# Patient Record
Sex: FEMALE | Race: WHITE | NOT HISPANIC OR LATINO | Employment: UNEMPLOYED | URBAN - METROPOLITAN AREA
[De-identification: names, ages, dates, MRNs, and addresses within clinical notes are randomized per-mention and may not be internally consistent; named-entity substitution may affect disease eponyms.]

---

## 2018-11-26 ENCOUNTER — TRANSCRIBE ORDERS (OUTPATIENT)
Dept: ADMINISTRATIVE | Facility: HOSPITAL | Age: 53
End: 2018-11-26

## 2018-11-26 DIAGNOSIS — Z12.39 BREAST SCREENING: Primary | ICD-10-CM

## 2018-12-12 ENCOUNTER — APPOINTMENT (OUTPATIENT)
Dept: MAMMOGRAPHY | Facility: HOSPITAL | Age: 53
End: 2018-12-12

## 2022-10-05 ENCOUNTER — OFFICE VISIT (OUTPATIENT)
Dept: CARDIOLOGY | Facility: CLINIC | Age: 57
End: 2022-10-05

## 2022-10-05 VITALS
HEIGHT: 65 IN | BODY MASS INDEX: 36.32 KG/M2 | HEART RATE: 91 BPM | WEIGHT: 218 LBS | RESPIRATION RATE: 16 BRPM | OXYGEN SATURATION: 95 % | SYSTOLIC BLOOD PRESSURE: 108 MMHG | DIASTOLIC BLOOD PRESSURE: 70 MMHG

## 2022-10-05 DIAGNOSIS — I25.2 HISTORY OF MI (MYOCARDIAL INFARCTION): Primary | ICD-10-CM

## 2022-10-05 DIAGNOSIS — I25.119 CORONARY ARTERY DISEASE INVOLVING NATIVE CORONARY ARTERY OF NATIVE HEART WITH ANGINA PECTORIS: ICD-10-CM

## 2022-10-05 DIAGNOSIS — R94.31 ABNORMAL ECG: ICD-10-CM

## 2022-10-05 DIAGNOSIS — Z01.810 PREOP CARDIOVASCULAR EXAM: ICD-10-CM

## 2022-10-05 PROBLEM — I25.709 CORONARY ARTERY DISEASE INVOLVING CORONARY BYPASS GRAFT OF NATIVE HEART WITH ANGINA PECTORIS: Status: ACTIVE | Noted: 2022-10-05

## 2022-10-05 PROCEDURE — 99204 OFFICE O/P NEW MOD 45 MIN: CPT | Performed by: INTERNAL MEDICINE

## 2022-10-05 PROCEDURE — 93000 ELECTROCARDIOGRAM COMPLETE: CPT | Performed by: INTERNAL MEDICINE

## 2022-10-05 RX ORDER — MELOXICAM 15 MG/1
15 TABLET ORAL DAILY
COMMUNITY
Start: 2022-08-18

## 2022-10-07 ENCOUNTER — TELEPHONE (OUTPATIENT)
Dept: CARDIOLOGY | Facility: CLINIC | Age: 57
End: 2022-10-07

## 2022-10-07 NOTE — TELEPHONE ENCOUNTER
There is a consult note on the patient- has that been provided?  Sounds like that needs to be sent

## 2022-10-07 NOTE — TELEPHONE ENCOUNTER
[employer] with [employer] called regarding her stress test that is schedule on 10/10/22.     The PA went through [facility]. She said that they are requesting a pt report but there is not a report in the pt chart.     Do you want to do a PEER TO PEER?    Kinza #: [phone]

## 2022-10-07 NOTE — TELEPHONE ENCOUNTER
Kinza called back.     They did faxed your office note to the insurance company. They are requesting the heart cath report from 1998. We do not have those records to provide to them.    Kinza wants to know if you could do a PEER to PEER? #: [phone] TRACK#: [id number]    Pt is schedule on Monday. They would need this done by 3:00PM.

## 2022-10-10 ENCOUNTER — APPOINTMENT (OUTPATIENT)
Dept: NUCLEAR MEDICINE | Facility: HOSPITAL | Age: 57
End: 2022-10-10

## 2022-10-10 ENCOUNTER — APPOINTMENT (OUTPATIENT)
Dept: CARDIOLOGY | Facility: HOSPITAL | Age: 57
End: 2022-10-10

## 2022-11-14 ENCOUNTER — APPOINTMENT (OUTPATIENT)
Dept: CARDIOLOGY | Facility: HOSPITAL | Age: 57
End: 2022-11-14

## 2022-11-14 ENCOUNTER — APPOINTMENT (OUTPATIENT)
Dept: NUCLEAR MEDICINE | Facility: HOSPITAL | Age: 57
End: 2022-11-14

## 2022-12-05 ENCOUNTER — HOSPITAL ENCOUNTER (OUTPATIENT)
Dept: NUCLEAR MEDICINE | Facility: HOSPITAL | Age: 57
Discharge: HOME | End: 2022-12-05

## 2022-12-05 ENCOUNTER — HOSPITAL ENCOUNTER (OUTPATIENT)
Dept: CARDIOLOGY | Facility: HOSPITAL | Age: 57
Discharge: HOME | End: 2022-12-05

## 2022-12-05 DIAGNOSIS — I25.2 HISTORY OF MI (MYOCARDIAL INFARCTION): ICD-10-CM

## 2022-12-05 DIAGNOSIS — I25.119 CORONARY ARTERY DISEASE INVOLVING NATIVE CORONARY ARTERY OF NATIVE HEART WITH ANGINA PECTORIS: ICD-10-CM

## 2022-12-05 DIAGNOSIS — Z01.810 PREOP CARDIOVASCULAR EXAM: ICD-10-CM

## 2022-12-05 LAB
BH CV REST NUCLEAR ISOTOPE DOSE: 10.9 MCI
BH CV STRESS BP STAGE 1: NORMAL
BH CV STRESS COMMENTS STAGE 1: NORMAL
BH CV STRESS DOSE REGADENOSON STAGE 1: 0.4
BH CV STRESS DURATION MIN STAGE 1: 0
BH CV STRESS DURATION SEC STAGE 1: 30
BH CV STRESS HR STAGE 1: 94
BH CV STRESS NUCLEAR ISOTOPE DOSE: 34.6 MCI
BH CV STRESS O2 STAGE 1: 99
BH CV STRESS PROTOCOL 1: NORMAL
BH CV STRESS RECOVERY BP: NORMAL MMHG
BH CV STRESS RECOVERY HR: 94 BPM
BH CV STRESS RECOVERY O2: 99 %
BH CV STRESS STAGE 1: 1
LV EF NUC BP: 40 %
MAXIMAL PREDICTED HEART RATE: 163 BPM
PERCENT MAX PREDICTED HR: 64.42 %
STRESS BASELINE BP: NORMAL MMHG
STRESS BASELINE HR: 88 BPM
STRESS O2 SAT REST: 98 %
STRESS PERCENT HR: 76 %
STRESS POST ESTIMATED WORKLOAD: 1 METS
STRESS POST EXERCISE DUR SEC: 30 SEC
STRESS POST O2 SAT PEAK: 100 %
STRESS POST PEAK BP: NORMAL MMHG
STRESS POST PEAK HR: 105 BPM
STRESS TARGET HR: 139 BPM

## 2022-12-05 PROCEDURE — 93018 CV STRESS TEST I&R ONLY: CPT | Performed by: INTERNAL MEDICINE

## 2022-12-05 PROCEDURE — 78452 HT MUSCLE IMAGE SPECT MULT: CPT | Performed by: INTERNAL MEDICINE

## 2022-12-05 PROCEDURE — 25010000002 REGADENOSON 0.4 MG/5ML SOLUTION: Performed by: INTERNAL MEDICINE

## 2022-12-05 PROCEDURE — 0 TECHNETIUM SESTAMIBI: Performed by: INTERNAL MEDICINE

## 2022-12-05 PROCEDURE — 93016 CV STRESS TEST SUPVJ ONLY: CPT | Performed by: INTERNAL MEDICINE

## 2022-12-05 PROCEDURE — 78452 HT MUSCLE IMAGE SPECT MULT: CPT

## 2022-12-05 PROCEDURE — A9500 TC99M SESTAMIBI: HCPCS | Performed by: INTERNAL MEDICINE

## 2022-12-05 PROCEDURE — 93017 CV STRESS TEST TRACING ONLY: CPT

## 2022-12-05 RX ADMIN — TECHNETIUM TC 99M SESTAMIBI 1 DOSE: 1 INJECTION INTRAVENOUS at 09:55

## 2022-12-05 RX ADMIN — TECHNETIUM TC 99M SESTAMIBI 1 DOSE: 1 INJECTION INTRAVENOUS at 08:27

## 2022-12-05 RX ADMIN — REGADENOSON 0.4 MG: 0.08 INJECTION, SOLUTION INTRAVENOUS at 09:55

## 2022-12-09 ENCOUNTER — TELEPHONE (OUTPATIENT)
Dept: CARDIOLOGY | Facility: CLINIC | Age: 57
End: 2022-12-09

## 2022-12-09 NOTE — TELEPHONE ENCOUNTER
I spoke with Lito Sinclair and updated pt on results/recommendations from provider.  Pt verbalized understanding and has no further questions at this time.    I faxed a report of ST with cardiac clearance to her doctor with [facility], Dr. Domenico Blevins, at [phone] per pt's request.    Thank you,    Haily Alvarado RN  Triage [facility]  12/09/22 11:45 EST

## 2022-12-09 NOTE — TELEPHONE ENCOUNTER
I tried to call Lito Sinclair but there was no answer.  Left a voicemail asking patient to call back.  Will continue to try to reach pt.    Thank you,    Haily Alvarado RN  Triage [facility]  12/09/22 09:34 EST

## 2022-12-09 NOTE — TELEPHONE ENCOUNTER
----- Message from Mirza Urena III, MD sent at 12/9/2022  9:24 AM EST -----  Please call-looks good with no ischemia seen (patient is known to have prior MI).  Okay to proceed with the plans for surgery from our standpoint

## 2023-04-21 ENCOUNTER — TELEPHONE (OUTPATIENT)
Dept: CARDIOLOGY | Facility: CLINIC | Age: 58
End: 2023-04-21
Payer: COMMERCIAL

## 2023-04-21 NOTE — TELEPHONE ENCOUNTER
Faxed pt's reports/record request to pre-admission testing fax [phone] . Received confirmation.     Felicita Mirza MA  4/21/23 3:25P

## 2023-07-27 ENCOUNTER — APPOINTMENT (OUTPATIENT)
Dept: GENERAL RADIOLOGY | Facility: HOSPITAL | Age: 58
End: 2023-07-27
Payer: COMMERCIAL

## 2023-07-27 ENCOUNTER — HOSPITAL ENCOUNTER (EMERGENCY)
Facility: HOSPITAL | Age: 58
Discharge: HOME | End: 2023-07-27
Attending: EMERGENCY MEDICINE
Payer: COMMERCIAL

## 2023-07-27 VITALS
SYSTOLIC BLOOD PRESSURE: 111 MMHG | WEIGHT: 216.3 LBS | HEIGHT: 65 IN | HEART RATE: 88 BPM | TEMPERATURE: 97.9 F | RESPIRATION RATE: 18 BRPM | OXYGEN SATURATION: 99 % | BODY MASS INDEX: 36.04 KG/M2 | DIASTOLIC BLOOD PRESSURE: 84 MMHG

## 2023-07-27 DIAGNOSIS — M86.9 OSTEOMYELITIS OF LEFT FOOT, UNSPECIFIED TYPE: Primary | ICD-10-CM

## 2023-07-27 LAB
ALBUMIN SERPL-MCNC: 4 G/DL (ref 3.5–5.2)
ALBUMIN/GLOB SERPL: 1.3 G/DL
ALP SERPL-CCNC: 135 U/L (ref 39–117)
ALT SERPL W P-5'-P-CCNC: 11 U/L (ref 1–33)
ANION GAP SERPL CALCULATED.3IONS-SCNC: 11.3 MMOL/L (ref 5–15)
APTT PPP: 41.6 SECONDS (ref 24.3–38.1)
AST SERPL-CCNC: 11 U/L (ref 1–32)
BASOPHILS # BLD AUTO: 0.06 10*3/MM3 (ref 0–0.2)
BASOPHILS NFR BLD AUTO: 0.4 % (ref 0–1.5)
BILIRUB SERPL-MCNC: 0.3 MG/DL (ref 0–1.2)
BUN SERPL-MCNC: 19 MG/DL (ref 6–20)
BUN/CREAT SERPL: 25.7 (ref 7–25)
CALCIUM SPEC-SCNC: 9 MG/DL (ref 8.6–10.5)
CHLORIDE SERPL-SCNC: 104 MMOL/L (ref 98–107)
CO2 SERPL-SCNC: 22.7 MMOL/L (ref 22–29)
CREAT SERPL-MCNC: 0.74 MG/DL (ref 0.57–1)
CRP SERPL-MCNC: 13.03 MG/DL (ref 0–0.5)
D-LACTATE SERPL-SCNC: 1.7 MMOL/L (ref 0.5–2)
DEPRECATED RDW RBC AUTO: 46 FL (ref 37–54)
EGFRCR SERPLBLD CKD-EPI 2021: 94.5 ML/MIN/1.73
EOSINOPHIL # BLD AUTO: 0.28 10*3/MM3 (ref 0–0.4)
EOSINOPHIL NFR BLD AUTO: 2 % (ref 0.3–6.2)
ERYTHROCYTE [DISTWIDTH] IN BLOOD BY AUTOMATED COUNT: 12.8 % (ref 12.3–15.4)
ERYTHROCYTE [SEDIMENTATION RATE] IN BLOOD: 39 MM/HR (ref 0–30)
GLOBULIN UR ELPH-MCNC: 3 GM/DL
GLUCOSE SERPL-MCNC: 106 MG/DL (ref 65–99)
HCT VFR BLD AUTO: 32.9 % (ref 34–46.6)
HGB BLD-MCNC: 10.4 G/DL (ref 12–15.9)
HOLD SPECIMEN: NORMAL
HOLD SPECIMEN: NORMAL
IMM GRANULOCYTES # BLD AUTO: 0.05 10*3/MM3 (ref 0–0.05)
IMM GRANULOCYTES NFR BLD AUTO: 0.4 % (ref 0–0.5)
INR PPP: 1.06 (ref 0.9–1.1)
LYMPHOCYTES # BLD AUTO: 1.48 10*3/MM3 (ref 0.7–3.1)
LYMPHOCYTES NFR BLD AUTO: 10.6 % (ref 19.6–45.3)
MAGNESIUM SERPL-MCNC: 1.9 MG/DL (ref 1.6–2.6)
MCH RBC QN AUTO: 30.2 PG (ref 26.6–33)
MCHC RBC AUTO-ENTMCNC: 31.6 G/DL (ref 31.5–35.7)
MCV RBC AUTO: 95.6 FL (ref 79–97)
MONOCYTES # BLD AUTO: 1.37 10*3/MM3 (ref 0.1–0.9)
MONOCYTES NFR BLD AUTO: 9.9 % (ref 5–12)
NEUTROPHILS NFR BLD AUTO: 10.66 10*3/MM3 (ref 1.7–7)
NEUTROPHILS NFR BLD AUTO: 76.7 % (ref 42.7–76)
PLATELET # BLD AUTO: 431 10*3/MM3 (ref 140–450)
PMV BLD AUTO: 10.1 FL (ref 6–12)
POTASSIUM SERPL-SCNC: 3.3 MMOL/L (ref 3.5–5.2)
PROCALCITONIN SERPL-MCNC: 0.05 NG/ML (ref 0–0.25)
PROT SERPL-MCNC: 7 G/DL (ref 6–8.5)
PROTHROMBIN TIME: 13.8 SECONDS (ref 12.1–15)
RBC # BLD AUTO: 3.44 10*6/MM3 (ref 3.77–5.28)
SODIUM SERPL-SCNC: 138 MMOL/L (ref 136–145)
WBC NRBC COR # BLD: 13.9 10*3/MM3 (ref 3.4–10.8)
WHOLE BLOOD HOLD COAG: NORMAL
WHOLE BLOOD HOLD SPECIMEN: NORMAL

## 2023-07-27 PROCEDURE — 73610 X-RAY EXAM OF ANKLE: CPT

## 2023-07-27 PROCEDURE — 85025 COMPLETE CBC W/AUTO DIFF WBC: CPT | Performed by: EMERGENCY MEDICINE

## 2023-07-27 PROCEDURE — 87040 BLOOD CULTURE FOR BACTERIA: CPT | Performed by: EMERGENCY MEDICINE

## 2023-07-27 PROCEDURE — 85610 PROTHROMBIN TIME: CPT | Performed by: EMERGENCY MEDICINE

## 2023-07-27 PROCEDURE — 36415 COLL VENOUS BLD VENIPUNCTURE: CPT

## 2023-07-27 PROCEDURE — 99283 EMERGENCY DEPT VISIT LOW MDM: CPT

## 2023-07-27 PROCEDURE — 86140 C-REACTIVE PROTEIN: CPT | Performed by: EMERGENCY MEDICINE

## 2023-07-27 PROCEDURE — 84145 PROCALCITONIN (PCT): CPT | Performed by: EMERGENCY MEDICINE

## 2023-07-27 PROCEDURE — 83605 ASSAY OF LACTIC ACID: CPT | Performed by: EMERGENCY MEDICINE

## 2023-07-27 PROCEDURE — 85652 RBC SED RATE AUTOMATED: CPT | Performed by: EMERGENCY MEDICINE

## 2023-07-27 PROCEDURE — 85730 THROMBOPLASTIN TIME PARTIAL: CPT | Performed by: EMERGENCY MEDICINE

## 2023-07-27 PROCEDURE — 80053 COMPREHEN METABOLIC PANEL: CPT | Performed by: EMERGENCY MEDICINE

## 2023-07-27 PROCEDURE — 83735 ASSAY OF MAGNESIUM: CPT | Performed by: EMERGENCY MEDICINE

## 2023-07-27 PROCEDURE — 73630 X-RAY EXAM OF FOOT: CPT

## 2023-07-27 RX ORDER — LINEZOLID 600 MG/1
600 TABLET, FILM COATED ORAL 2 TIMES DAILY
Qty: 14 TABLET | Refills: 0 | Status: SHIPPED | OUTPATIENT
Start: 2023-07-27 | End: 2023-08-03

## 2023-07-27 RX ORDER — LINEZOLID 600 MG/1
600 TABLET, FILM COATED ORAL ONCE
Status: COMPLETED | OUTPATIENT
Start: 2023-07-27 | End: 2023-07-27

## 2023-07-27 RX ADMIN — LINEZOLID 600 MG: 600 TABLET, FILM COATED ORAL at 16:39

## 2023-07-27 NOTE — ED NOTES
Wrong Doctor called.    Called office for Dr. Judah Sarabia [phone]. had to LM for on call Doctor.

## 2023-07-27 NOTE — ED PROVIDER NOTES
Subjective   History of Present Illness  57-year-old female past medical history significant for hypertension hyperlipidemia and per patient osteomyelitis in her left foot for which she sees an infectious disease doctor in [place] and is on doxycycline daily presents emergency room complaining of 2 days worth of worsening redness swelling and pain in her left foot.  Patient states this is consistent with her osteomyelitis.  Patient has not contacted her infectious disease doctor.  She went to see her primary care physician today who sent her to this emergency room.  Patient has no other complaints.  Patient denies fever shortness of breath chest pain anorexia nausea vomiting diarrhea or dysuria.    Review of Systems   Musculoskeletal:         Left foot pain     Past Medical History:   Diagnosis Date    Cervical cancer     Hyperlipidemia     Hypertension        Allergies   Allergen Reactions    Contrast Dye (Echo Or Unknown Ct/Mr) Hives    Albuterol Palpitations    Prednisone Palpitations       Past Surgical History:   Procedure Laterality Date    ANKLE FUSION      CARPAL TUNNEL RELEASE      DILATATION AND CURETTAGE      FOOT FRACTURE SURGERY      TUBAL ABDOMINAL LIGATION         Family History   Problem Relation Age of Onset    Heart disease Mother     Hypertension Father     Heart disease Father     Diabetes Father     Cancer Maternal Grandmother        Social History     Socioeconomic History    Marital status:    Tobacco Use    Smoking status: Every Day    Smokeless tobacco: Never    Tobacco comments:     daily caffiene   Substance and Sexual Activity    Alcohol use: Never    Drug use: Never    Sexual activity: Defer           Objective   Physical Exam  Vitals and nursing note reviewed.   Constitutional:       General: She is not in acute distress.     Appearance: Normal appearance. She is not ill-appearing, toxic-appearing or diaphoretic.   HENT:      Head: Normocephalic and atraumatic.      Nose: Nose  normal.      Mouth/Throat:      Mouth: Mucous membranes are moist.   Eyes:      Conjunctiva/sclera: Conjunctivae normal.   Cardiovascular:      Rate and Rhythm: Normal rate.      Pulses: Normal pulses.   Pulmonary:      Effort: Pulmonary effort is normal.   Abdominal:      General: Abdomen is flat. There is no distension.      Tenderness: There is no abdominal tenderness.   Musculoskeletal:         General: No swelling. Normal range of motion.      Cervical back: Normal range of motion and neck supple.      Right foot: Normal.      Left foot: Decreased range of motion. Swelling, tenderness and bony tenderness present.      Comments: Patient has 2+ edema to her left foot to just above the ankle.  Patient has pulses that are symmetric and equal in bilateral lower extremities in dorsal pedal.  Patient does not want to flex or extend her foot nor wiggle her toes secondary to pain.  She has widening between the first and second digits with surgical scars consistent with patient's history.  There is no open wound or purulence noted.   Skin:     General: Skin is warm and dry.   Neurological:      General: No focal deficit present.      Mental Status: She is alert and oriented to person, place, and time.   Psychiatric:         Mood and Affect: Mood normal.       Procedures           ED Course  ED Course as of 07/27/23 1632   Thu Jul 27, 2023   1607 X-ray ankle left:  IMPRESSION:  Lower extremity and foot soft tissue swelling which is  nonspecific but may reflect cellulitis. No plain film findings to  suggest osteomyelitis.     This report was finalized on 7/27/2023 1:26 PM by Dr. JOSETTE Richey MD.   [BH]   1607 X-ray foot left:  IMPRESSION:  Lower extremity and foot soft tissue swelling which is  nonspecific but may reflect cellulitis. No plain film findings to  suggest osteomyelitis.     This report was finalized on 7/27/2023 1:26 PM by Dr. JOSETTE Richey MD.   [BH]   1608 WBC(!): 13.90 [BH]   1608 Hemoglobin(!):  10.4 [BH]   1608 Hematocrit(!): 32.9 [BH]   1608 Sed Rate(!): 39 [BH]   1608 Procalcitonin: 0.05 [BH]   1608 Lactate: 1.7 [BH]   1608 Glucose(!): 106 [BH]   1608 BUN: 19 [BH]   1608 Creatinine: 0.74 [BH]   1608 Potassium(!): 3.3 [BH]   1628 Spoke with Dr. Gabe Phan who is on-call for Dr. Barraza who asked that patient can get a dose of linezolid today and then call Dr. Sarabia tomorrow before she feels the remainder of the prescription to make sure that that is what he wants to do as well.  Otherwise patient can follow-up outpatient only with him and/or also can always return to emergency room for new persistent or worsening symptoms.  I discussed this with patient and she is comfortable discharge home with the above recommendations and/or can return emergency room for new persistent or worsening symptoms. []      ED Course User Index  [] Mahesh Ornelas MD                                           Medical Decision Making  My diagnosis for lower extremity pain and injury includes but is not limited to hip fracture, femur fracture, hip dislocation, hip contusion, hip sprain, hip strain, pelvic fracture, ischio-tibial band pain, ischio-tibial band bursitis, knee sprain, patella dislocation, knee dislocation, internal derangement of knee, fractures of the femur, tibia, fibula, ankle, foot and digits, ankle sprain, ankle dislocation, Lisfranc fracture, fracture dislocations of the digits, pulmonary embolism, claudication, peripheral vascular disease, gout, osteoarthritis, rheumatoid arthritis, bursitis, septic joint, poly-rheumatica, polyarthralgia and other inflammatory or infectious disease processes.     Amount and/or Complexity of Data Reviewed  Labs: ordered. Decision-making details documented in ED Course.  Radiology: ordered. Decision-making details documented in ED Course.        Final diagnoses:   Osteomyelitis of left foot, unspecified type       ED Disposition  ED Disposition       ED  Disposition   Discharge    Condition   Stable    Comment   --               Clint Sarabia MD  [address]  [facility] [id number] [phone]    Call       [facility] EMERGENCY DEPARTMENT  [address]  [place] [address] [phone]  Go to   As needed         Medication List        New Prescriptions      linezolid 600 MG tablet  Commonly known as: ZYVOX  Take 1 tablet by mouth 2 (Two) Times a Day for 7 days.               Where to Get Your Medications        These medications were sent to [id number] - [place], [place] - [address] [place] [address] AVE [address] - [phone]  - [phone] [address] [facility] [address]      Phone: [phone]   linezolid 600 MG tablet            Mahesh Ornelas MD  07/27/23 2905

## 2023-07-28 ENCOUNTER — DOCUMENTATION (OUTPATIENT)
Dept: INFECTIOUS DISEASES | Facility: CLINIC | Age: 58
End: 2023-07-28
Payer: COMMERCIAL

## 2023-07-28 NOTE — PROGRESS NOTES
Patient seen in ED with recurrent foot infection.  She did not make it to her MRI or prior ID follow-up I have only seen her once.  I think she probably has a deep osteomyelitis/hardware infection and is developing an abscess.  She was prescribed linezolid in the ED which is okay to take but I did encourage her to come back to the ER if she worsens and we will have to get an MRI of her foot at that point.   oriented to person, place and time,  normal sensation

## 2023-08-01 ENCOUNTER — TELEPHONE (OUTPATIENT)
Dept: ORTHOPEDIC SURGERY | Facility: CLINIC | Age: 58
End: 2023-08-01
Payer: COMMERCIAL

## 2023-08-01 LAB
BACTERIA SPEC AEROBE CULT: NORMAL
BACTERIA SPEC AEROBE CULT: NORMAL

## 2023-08-07 ENCOUNTER — HOSPITAL ENCOUNTER (OUTPATIENT)
Dept: MRI IMAGING | Facility: HOSPITAL | Age: 58
Discharge: HOME | End: 2023-08-07
Payer: COMMERCIAL

## 2023-08-07 DIAGNOSIS — M86.60 CHRONIC OSTEOMYELITIS: ICD-10-CM

## 2023-08-14 ENCOUNTER — OFFICE VISIT (OUTPATIENT)
Dept: INFECTIOUS DISEASES | Facility: CLINIC | Age: 58
End: 2023-08-14
Payer: COMMERCIAL

## 2023-08-14 VITALS — HEIGHT: 65 IN | WEIGHT: 214.4 LBS | BODY MASS INDEX: 35.72 KG/M2

## 2023-08-14 DIAGNOSIS — Z79.2 LONG TERM (CURRENT) USE OF ANTIBIOTICS: ICD-10-CM

## 2023-08-14 DIAGNOSIS — M86.60 CHRONIC OSTEOMYELITIS: Primary | ICD-10-CM

## 2023-08-14 PROCEDURE — 99214 OFFICE O/P EST MOD 30 MIN: CPT | Performed by: INTERNAL MEDICINE

## 2023-08-14 NOTE — PROGRESS NOTES
cc: Recurrent foot infection    Per initial clinic note: This is a very nice 57-year-old with history of coronary artery disease, hypertension.  She has a history of multiple surgeries on her feet dating back to 1988.  Apparently she has issues with the bones where they have to break the bone and then insert pins.  Her last surgery was done in 2017 by podiatry.  Since August 2022 she has developed recurrent infections of the left foot.  Typically she will have a pustule develop between the first and second toes which is lanced and drained purulent material.  She also has needlelike pain in the foot along with swelling and erythema.  This has been cultured in the past and grew MRSA per her report.  She typically receives Linezolid with resolution in her symptoms only to recur.  She said the infections were occurring intermittently but are now occurring maybe 1-2 times per month.  No constitutional symptoms of infection such as fever, chills, night sweats.     At initial clinic visit on June 26, 2023 she was started on doxycycline for suppression while awaiting additional work-up.  Plain films were okay.  MRI was ordered and scheduled for July 18 but the patient canceled.  She canceled her follow-up with me on July 24.  Apparently also had MRI scheduled for August 7 but says she did not tolerate it and therefore has been rescheduled for August 31.  She attempted to cancel her appointment today but I asked that she come into the hospital.  She did present to [facility] on July 28 with exacerbation of foot which was treated with linezolid successfully.  Reportedly ABIs have been obtained and were normal.    PMH: CAD, HTN, anemia, cervical cancer, osteoarthritis,, depression multiple feet surgeries bilaterally  No family history of infectious diseases  Social history: She is  and lives in [place], [place].  Smokes half pack per day  Allergies: Dye from cardiac cath  Review of Systems: All other reviewed and  "negative except as per HPI    Height 165.1 cm (65\"), weight 97.3 kg (214 lb 6.4 oz).  GENERAL: Awake and alert, in no acute distress.   HEENT: Oropharynx is clear. Hearing is grossly normal.   EYES: . No conjunctival injection. No lid lag.   LUNGS:normal respiratory effort.   SKIN: no cutaneous eruptions in exposed areas.  There is no cellulitis, of the left foot today  PSYCHIATRIC: Appropriate mood, affect, insight, and judgment.         DIAGNOSTICS:  Lab Results   Component Value Date    WBC 13.90 (H) 07/27/2023    HGB 10.4 (L) 07/27/2023    HCT 32.9 (L) 07/27/2023     07/27/2023     Bcx Neg  CRP 13; ESR 39      Assessment and Plan  1.  Chronic osteomyelitis of the right foot:   2.  Long-term current use antibiotics     I impressed upon the patient the importance of adhering to treatment plan and obtaining MRI.  Currently no cellulitis or drainage from foot. I have reservations that she may not be able to accomplish this as an outpatient and therefore I said if she had a recurrence is probably best just to come to the emergency department so we can admit her and expedite the work-up.  MRI currently scheduled for August 31.  She is to call me after this is complete so we can arrange ID follow-up       "

## 2023-08-31 ENCOUNTER — HOSPITAL ENCOUNTER (OUTPATIENT)
Dept: MRI IMAGING | Facility: HOSPITAL | Age: 58
Discharge: HOME | End: 2023-08-31
Admitting: INTERNAL MEDICINE
Payer: COMMERCIAL

## 2023-08-31 LAB — CREAT BLDA-MCNC: 0.9 MG/DL (ref 0.6–1.3)

## 2023-08-31 PROCEDURE — 73720 MRI LWR EXTREMITY W/O&W/DYE: CPT

## 2023-08-31 PROCEDURE — 82565 ASSAY OF CREATININE: CPT

## 2023-08-31 PROCEDURE — A9577 INJ MULTIHANCE: HCPCS | Performed by: INTERNAL MEDICINE

## 2023-08-31 PROCEDURE — 0 GADOBENATE DIMEGLUMINE 529 MG/ML SOLUTION: Performed by: INTERNAL MEDICINE

## 2023-08-31 RX ADMIN — GADOBENATE DIMEGLUMINE 20 ML: 529 INJECTION, SOLUTION INTRAVENOUS at 14:27

## 2023-10-17 ENCOUNTER — TELEPHONE (OUTPATIENT)
Dept: INFECTIOUS DISEASES | Facility: CLINIC | Age: 58
End: 2023-10-17
Payer: COMMERCIAL

## 2023-10-17 NOTE — TELEPHONE ENCOUNTER
I called patient to ask her about her appt w/ Dr. Charlesing she missed yesterday. She stated that she has been ill w/ congestion, nausea, nasal drip and said she went on line and thought she had clicked the button to cancel appt. She said when she feels better she will call to reschedule appt.

## 2023-11-05 NOTE — PROGRESS NOTES
Subjective:     Encounter Date:10/05/22      Patient ID: Lito Sinclair is a 56 y.o. female.    Chief Complaint:  History of Present Illness    Dear Morgan PUGH,    I had the pleasure of seeing this patient in the office today for initial evaluation and consultation.  I appreciate that you sent her in to see us.  They come in today to be seen for assessment of cardiac risk prior to right knee surgery.    Patient reports that I saw her back in 1998.  I do not have any records from that time.  She states she had a heart cath at that time at [facility] that showed blockage and collateral formation.  She is not sure the details.  She never came back to be seen after that and I do not have any independent recollection of the results of her heart cath.    Patient states that she did not come back to see cardiology or other physicians because she just did not like to be seen by doctors.  However now she is scheduled to have a right knee replacement.  Its not yet scheduled.  It is required that she have cardiac clearance.    She basically can barely walk because of pain in her right knee.  She does not experience any chest pain.  She does note dyspnea if she has to do anything that is little bit more strenuous but usually her knee just stops her from doing that.  Occasional indigestion feelings.  No associated nausea vomiting or diaphoresis.    She has not had any cardiac issues, nor has she had any cardiac evaluation since 1998 or so.  She has not seen another cardiologist.    The following portions of the patient's history were reviewed and updated as appropriate: allergies, current medications, past family history, past medical history, past social history, past surgical history and problem list.      ECG 12 Lead    Date/Time: 10/5/2022 4:12 PM  Performed by: Mirza Urena III, MD  Authorized by: Mirza Urena III, MD   Comparison: compared with previous ECG   Similar to previous ECG  Rhythm: sinus rhythm  Rate:  "normal  Conduction: left anterior fascicular block  Q waves: V3    ST Depression: I and aVF  T inversion: I and aVF  T flattening: V1, V2 and V3  QRS axis: left  Other: no other findings    Clinical impression: abnormal EKG               Objective:     Vitals:    10/05/22 1433   BP: 108/70   Pulse: 91   Resp: 16   SpO2: 95%   Weight: 98.9 kg (218 lb)   Height: 165.1 cm (65\")     Body mass index is 36.28 kg/m².      Vitals reviewed.   Constitutional:       General: Not in acute distress.     Appearance: Well-developed. Not diaphoretic.   Eyes:      General:         Right eye: No discharge.         Left eye: No discharge.      Conjunctiva/sclera: Conjunctivae normal.      Pupils: Pupils are equal, round, and reactive to light.   HENT:      Head: Normocephalic and atraumatic.      Nose: Nose normal.   Neck:      Thyroid: No thyromegaly.      Trachea: No tracheal deviation.      Lymphadenopathy: No cervical adenopathy.   Pulmonary:      Effort: Pulmonary effort is normal. No respiratory distress.      Breath sounds: Normal breath sounds. No stridor.   Chest:      Chest wall: Not tender to palpatation.   Cardiovascular:      Normal rate. Regular rhythm.      Murmurs: There is no murmur.      . No S3 gallop. No click. No rub.   Pulses:     Intact distal pulses.   Abdominal:      General: Bowel sounds are normal. There is no distension.      Palpations: Abdomen is soft. There is no abdominal mass.      Tenderness: There is no abdominal tenderness. There is no guarding or rebound.   Musculoskeletal: Normal range of motion.         General: No tenderness or deformity.      Cervical back: Normal range of motion and neck supple. Skin:     General: Skin is warm and dry.      Findings: No erythema or rash.   Neurological:      Mental Status: Alert and oriented to person, place, and time.      Deep Tendon Reflexes: Reflexes are normal and symmetric.   Psychiatric:         Thought Content: Thought content normal.         Data and " records reviewed:     No results found for: GLUCOSE, BUN, CREATININE, EGFRIFNONA, EGFRIFAFRI, BCR, K, CO2, CALCIUM, PROTENTOTREF, ALBUMIN, LABIL2, BILIRUBIN, AST, ALT  No results found for: CHOL  No results found for: TRIG  No results found for: HDL  No results found for: LDL  No results found for: VLDL  No results found for: LDLHDL    No radiology results for the last 90 days.          Assessment:          Diagnosis Plan   1. History of MI (myocardial infarction)  Stress Test With Myocardial Perfusion One Day    ECG 12 Lead   2. Preop cardiovascular exam  Stress Test With Myocardial Perfusion One Day    ECG 12 Lead   3. Coronary artery disease involving native coronary artery of native heart with angina pectoris (HCC)  Stress Test With Myocardial Perfusion One Day    ECG 12 Lead   4. Abnormal ECG  ECG 12 Lead          Plan:       1.  Preoperative cardiovascular exam-patient is here for assessment of cardiac risk prior to surgical intervention on her right knee.  Patient reports history of microinfarction with heart blockage and collateral formation back in 1998 or so.  I do not have the results of her heart cath and then, she has not had any evaluation since.  Now incredibly limited because of problems with her right knee- she barely can walk a few steps before he has to stop because of knee pain.  We will arrange for an pharmacologic nuclear perfusion scan to be performed to assess both left ventricular size and function as well as for any inducible ischemia.  Further evaluation and treatment will then be predicated on the results  2.  Coronary disease- unknown lipid status, we will recheck her primary to get her most recent blood work  3.  Tobacco dependence on cigarettes, smoking cessation is recommended.  ,  Thank you very much for allowing us to participate in the care of this pleasant patient.  Please don't hesitate to call if I can be of assistance in any way.      Current Outpatient Medications:   •   meloxicam (MOBIC) 15 MG tablet, Take 15 mg by mouth Daily., Disp: , Rfl:          No follow-ups on file.     3 (mild pain)

## 2025-06-03 ENCOUNTER — OFFICE VISIT (OUTPATIENT)
Dept: ORTHOPEDIC SURGERY | Facility: CLINIC | Age: 60
End: 2025-06-03
Payer: COMMERCIAL

## 2025-06-03 VITALS
BODY MASS INDEX: 35.65 KG/M2 | DIASTOLIC BLOOD PRESSURE: 82 MMHG | HEIGHT: 65 IN | SYSTOLIC BLOOD PRESSURE: 122 MMHG | HEART RATE: 85 BPM | WEIGHT: 214 LBS

## 2025-06-03 DIAGNOSIS — M17.11 PRIMARY OSTEOARTHRITIS OF RIGHT KNEE: ICD-10-CM

## 2025-06-03 DIAGNOSIS — M25.561 RIGHT KNEE PAIN, UNSPECIFIED CHRONICITY: Primary | ICD-10-CM

## 2025-06-03 PROCEDURE — 1159F MED LIST DOCD IN RCRD: CPT | Performed by: ORTHOPAEDIC SURGERY

## 2025-06-03 PROCEDURE — 99203 OFFICE O/P NEW LOW 30 MIN: CPT | Performed by: ORTHOPAEDIC SURGERY

## 2025-06-03 PROCEDURE — 1160F RVW MEDS BY RX/DR IN RCRD: CPT | Performed by: ORTHOPAEDIC SURGERY

## 2025-06-03 RX ORDER — SEMAGLUTIDE 2.4 MG/.75ML
INJECTION, SOLUTION SUBCUTANEOUS
COMMUNITY
Start: 2025-05-27

## 2025-06-03 RX ORDER — LORATADINE 10 MG/1
10 TABLET ORAL DAILY
COMMUNITY
Start: 2024-12-23

## 2025-06-03 NOTE — PROGRESS NOTES
Subjective: Osteoarthritis right knee     Patient ID: Lito Sinclair is a 59 y.o. female.    Chief Complaint:    History of Present Illness 59-year-old female presents for evaluation of her arthritic right knee.  She has had pain and symptoms for many years previously treated at [facility].  Apparently scheduled for total knee arthroplasty but at that time she was smoking and insurance declined this procedure.  She has since quit smoking and presents here for surgical consultation.  She has a coworker had a total knee replacement by Dr. Garcia and is requesting a referral to him.  She has failed cortisone injection gel injections anti-inflammatory medication and physical therapy.       Social History     Occupational History    Not on file   Tobacco Use    Smoking status: Former     Current packs/day: 0.00     Average packs/day: 0.5 packs/day for 48.0 years (24.0 ttl pk-yrs)     Types: Cigarettes     Start date: 3/3/1979     Quit date: 2025     Years since quittin.3    Smokeless tobacco: Never    Tobacco comments:     daily caffiene   Vaping Use    Vaping status: Not on file   Substance and Sexual Activity    Alcohol use: Never    Drug use: Never    Sexual activity: Not Currently     Partners: Male     Birth control/protection: Tubal ligation      Review of Systems      Past Medical History:   Diagnosis Date    Abnormal Pap smear of cervix All the time    Anemia     Cervical cancer     Coronary artery disease 89    CTS (carpal tunnel syndrome)     Foot ulcer 2022    Fracture of ankle 2006    Hyperlipidemia     Hypertension     Lumbosacral disc disease 96    MRSA (methicillin resistant Staphylococcus aureus) 820    Myocardial infarction 91    Varicella     Not for sure     Past Surgical History:   Procedure Laterality Date    ANKLE FUSION      CARPAL TUNNEL RELEASE      DILATATION AND CURETTAGE      FOOT FRACTURE SURGERY      TUBAL ABDOMINAL LIGATION       Family History   Problem Relation Age  of Onset    Heart disease Mother     Arthritis Mother         [place] david no    Hypertension Father     Heart disease Father     Diabetes Father     COPD Father     Cancer Maternal Grandmother          Objective:  Vitals:    06/03/25 0924   BP: 122/82   Pulse: 85         06/03/25 0924   Weight: 97.1 kg (214 lb)     Body mass index is 35.61 kg/m².  Class 2 Severe Obesity (BMI >=35 and <=39.9). Obesity-related health conditions include the following:  . Obesity is  . BMI is  . We discussed .        Ortho Exam   AP lateral sunrise view of the right knee does show end-stage genu arthritis with a moderate valgus deformity where she is bone-on-bone in the lateral compartment.  Deformity measures probably 15 degrees.  She is alert and oriented x 3.  Has normocephalic and sclerae clear.  The right knee shows no effusion or erythema.  There is a boggy synovitis.  Again she has a valgus deformity that measures probably 15 degrees.  There is marked pain and crepitus range of motion which is 0 to 125 degrees.  Quad hamstring function is 4 and half out of 5 secondary to pain.  No instability in flexion extension.  No varus or valgus instability though there is pain with valgus stressing laterally.  Calf nontender she has good distal pulses.    Assessment:        1. Right knee pain, unspecified chronicity    2. Primary osteoarthritis of right knee           Plan: I reviewed the x-rays with the patient.  Again she has failed nonoperative management to date and she is no longer smoking so she is a candidate for knee replacement.  A coworker had surgery by Dr. Garcia she is requesting referral to Dr. Garcia so that referral has been made.  Answered all questions            Work Status:    ALENA query complete.    Orders:  Orders Placed This Encounter   Procedures    XR Knee 3+ View With [place] Right       Medications:  No orders of the defined types were placed in this encounter.      Followup:  Return in about 3 weeks  (around 6/24/2025).          Dictated utilizing Dragon dictation

## 2025-06-04 ENCOUNTER — PATIENT ROUNDING (BHMG ONLY) (OUTPATIENT)
Dept: ORTHOPEDIC SURGERY | Facility: CLINIC | Age: 60
End: 2025-06-04
Payer: COMMERCIAL

## 2025-06-04 NOTE — PROGRESS NOTES
A My-Chart message has been sent to the patient for PATIENT ROUNDING with [facility].

## 2025-06-25 ENCOUNTER — OFFICE VISIT (OUTPATIENT)
Dept: ORTHOPEDIC SURGERY | Facility: CLINIC | Age: 60
End: 2025-06-25
Payer: COMMERCIAL

## 2025-06-25 VITALS
DIASTOLIC BLOOD PRESSURE: 84 MMHG | HEIGHT: 65 IN | BODY MASS INDEX: 35.65 KG/M2 | HEART RATE: 82 BPM | WEIGHT: 214 LBS | SYSTOLIC BLOOD PRESSURE: 134 MMHG

## 2025-06-25 DIAGNOSIS — M17.11 PRIMARY OSTEOARTHRITIS OF RIGHT KNEE: Primary | ICD-10-CM

## 2025-06-25 RX ADMIN — LIDOCAINE HYDROCHLORIDE 8 ML: 10 INJECTION, SOLUTION EPIDURAL; INFILTRATION; INTRACAUDAL; PERINEURAL at 08:29

## 2025-06-25 RX ADMIN — TRIAMCINOLONE ACETONIDE 80 MG: 40 INJECTION, SUSPENSION INTRA-ARTICULAR; INTRAMUSCULAR at 08:29

## 2025-06-25 NOTE — PROGRESS NOTES
Subjective:     Patient ID: Lito Sinclair is a 59 y.o. female.    Chief Complaint:  Right knee pain, new patient to examiner  History of Present Illness  The patient presents to the clinic today for evaluation of right knee pain.    She has been experiencing right knee pain for several years, which has particularly worsened over the last 8 to 12 months. The pain is localized to the anterior and lateral aspect of her right knee and is exacerbated by prolonged walking, weightbearing, and deep flexion activities. She reports significant associated swelling that fluctuates in severity. There is no specific injury prior to the onset of her symptoms. The pain does not radiate from the knee itself, and she does not experience any associated numbness or tingling. Her pain is on a daily basis and affecting her activities of daily living to a significant level with associated crepitus. She has a history of chronic osteomyelitis of the right foot, which has been resolved with antibiotic treatment. Despite previous injections including both cortisone and gel, she noted minimal to no improvement in her knee symptoms. Home exercises performed for over 12 weeks have also resulted in little improvement.    Additionally, she experiences mild intermittent groin pain.    SOCIAL HISTORY  Exercise: Home exercises for greater than 12 weeks    Social History     Occupational History    Not on file   Tobacco Use    Smoking status: Former     Current packs/day: 0.00     Average packs/day: 0.5 packs/day for 48.0 years (24.0 ttl pk-yrs)     Types: Cigarettes     Start date: 3/3/1979     Quit date: 2025     Years since quittin.3     Passive exposure: Past    Smokeless tobacco: Never    Tobacco comments:     daily caffiene   Vaping Use    Vaping status: Never Used   Substance and Sexual Activity    Alcohol use: Never    Drug use: Never    Sexual activity: Not Currently     Partners: Male     Birth control/protection: Tubal ligation     "  Past Medical History:   Diagnosis Date    Abnormal Pap smear of cervix All the time    Anemia 2023    Cervical cancer     Coronary artery disease 89    CTS (carpal tunnel syndrome) 2007    Foot ulcer 08 2022    Fracture of ankle 2006    Hyperlipidemia     Hypertension     Lumbosacral disc disease 89    MRSA (methicillin resistant Staphylococcus aureus) 082022    Myocardial infarction 89    Varicella     Not for sure     Past Surgical History:   Procedure Laterality Date    ANKLE FUSION      CARPAL TUNNEL RELEASE      DILATATION AND CURETTAGE      FOOT FRACTURE SURGERY      TUBAL ABDOMINAL LIGATION         Family History   Problem Relation Age of Onset    Heart disease Mother     Arthritis Mother         Crippling  aritis no    Hypertension Father     Heart disease Father     Diabetes Father     COPD Father     Cancer Maternal Grandmother          Review of Systems        Objective:  Vitals:    06/25/25 0804   BP: 134/84   Pulse: 82   Weight: 97.1 kg (214 lb)   Height: 165.1 cm (65\")         06/25/25 0804   Weight: 97.1 kg (214 lb)     Body mass index is 35.61 kg/m².    Physical Exam    Vital signs reviewed.   General: No acute distress, alert and oriented  Eyes: conjunctiva clear; pupils equally round and reactive  ENT: external ears and nose atraumatic; oropharynx clear  CV: no peripheral edema  Resp: normal respiratory effort  Skin: no rashes or wounds; normal turgor  Psych: mood and affect appropriate; recent and remote memory intact       Physical Exam  - Musculoskeletal:    - Right knee:      - 1+ effusion      - Active range of motion 3 to 90 degrees      - Overall valgus alignment      - Maximal tenderness to palpation at lateral joint line      - Strength 4 out of 5 on flexion and extension      - Stable to varus and valgus stress at 3 and 30 degrees      - Grade 1A Lachman      - Negative anterior and posterior drawer      - Lateral patella tracking      - Positive active patellar compression test      - " Significant crepitus on range of motion    - Right hip:      - Mildly positive [facility]      - Localized to the posterior buttock region        Imaging:  Independent review of imaging from [facility], 2025 including independent interpretation of radiology results indicates advanced tricompartmental osteoarthritis with significant valgus alignment and bone-on-bone articulation of lateral compartment with reactive osteophyte formation noted, advanced patellofemoral arthritis noted as well.  No comparison images available    Assessment:        1. Primary osteoarthritis of right knee             Assessment & Plan  1. Right knee pain:  Lito presents with advanced arthritic changes in her right knee, which have not responded to previous cortisone and gel injections or home exercises. A cortisone injection was recommended today for both diagnostic and therapeutic purposes. If there is a significant but short-lived response to the injection, she may be a candidate for total knee arthroplasty, although there are concerns due to her previous chronic osteomyelitis, despite its successful treatment.    2. Right hip pain:  Lito reports mild intermittent groin pain. It is important to rule out referred pain from the hip that may be overshadowing the knee pain, especially considering the previous failed injection. X-rays of her pelvis and an AP view of the hip will be obtained during her next visit in 6 weeks to evaluate this new issue.    Follow-up: The patient will follow up in 6 weeks.    PROCEDURE    Cortisone injection was administered to the right knee today.    Patient would like to proceed with cortisone injection today to the right knee. Recommended limited use of affected extremity for the next 24 hours to only essential activites other than work on general active and passive motion. Recommended supplementing with ice and soft tissue massage. Discussed with patient that they should see  results in 5-7 days, if no improvement in 5-6 weeks I have asked them to call the office to review other options. Patient should call office immediately if they notice redness, warmth, fevers, chills, or residual numbness or tingling for greater than 6 hours after injection.       - Large Joint Arthrocentesis: R knee on 6/25/2025 8:29 AM  Indications: pain  Details: 22 G needle, anterolateral approach  Medications: 80 mg triamcinolone acetonide 40 MG/ML; 8 mL lidocaine PF 1% 1 %  Outcome: tolerated well, no immediate complications  Procedure, treatment alternatives, risks and benefits explained, specific risks discussed. Consent was given by the patient. Immediately prior to procedure a time out was called to verify the correct patient, procedure, equipment, support staff and site/side marked as required. Patient was prepped and draped in the usual sterile fashion.         Lito MENDEZ Gerisreedhar was in agreement with plan and had all questions answered.     Orders:  Orders Placed This Encounter   Procedures    - Large Joint Arthrocentesis: R knee       Medications:  No orders of the defined types were placed in this encounter.      Followup:  No follow-ups on file.    Diagnoses and all orders for this visit:    1. Primary osteoarthritis of right knee (Primary)    Other orders  -     - Large Joint Arthrocentesis: R knee                   Dictated utilizing Dragon dictation     Patient or patient representative verbalized consent for the use of Ambient Listening during the visit with  Morgan Garcia MD for chart documentation. 6/26/2025  08:19 EDT

## 2025-06-26 RX ORDER — LIDOCAINE HYDROCHLORIDE 10 MG/ML
8 INJECTION, SOLUTION EPIDURAL; INFILTRATION; INTRACAUDAL; PERINEURAL
Status: COMPLETED | OUTPATIENT
Start: 2025-06-25 | End: 2025-06-25

## 2025-06-26 RX ORDER — TRIAMCINOLONE ACETONIDE 40 MG/ML
80 INJECTION, SUSPENSION INTRA-ARTICULAR; INTRAMUSCULAR
Status: COMPLETED | OUTPATIENT
Start: 2025-06-25 | End: 2025-06-25

## 2025-08-13 ENCOUNTER — LAB (OUTPATIENT)
Dept: LAB | Facility: HOSPITAL | Age: 60
End: 2025-08-13
Payer: COMMERCIAL

## 2025-08-13 ENCOUNTER — OFFICE VISIT (OUTPATIENT)
Dept: ORTHOPEDIC SURGERY | Facility: CLINIC | Age: 60
End: 2025-08-13
Payer: COMMERCIAL

## 2025-08-13 VITALS
DIASTOLIC BLOOD PRESSURE: 73 MMHG | WEIGHT: 211 LBS | HEIGHT: 65 IN | HEART RATE: 96 BPM | SYSTOLIC BLOOD PRESSURE: 115 MMHG | BODY MASS INDEX: 35.16 KG/M2

## 2025-08-13 DIAGNOSIS — M17.11 PRIMARY OSTEOARTHRITIS OF RIGHT KNEE: ICD-10-CM

## 2025-08-13 DIAGNOSIS — B99.9 CHRONIC INFECTION: ICD-10-CM

## 2025-08-13 DIAGNOSIS — M25.551 RIGHT HIP PAIN: Primary | ICD-10-CM

## 2025-08-13 DIAGNOSIS — M94.251 CHONDROMALACIA OF HIP, RIGHT: ICD-10-CM

## 2025-08-13 LAB
BASOPHILS # BLD AUTO: 0.12 10*3/MM3 (ref 0–0.2)
BASOPHILS NFR BLD AUTO: 1.4 % (ref 0–1.5)
CRP SERPL-MCNC: 6.18 MG/DL (ref 0–0.5)
DEPRECATED RDW RBC AUTO: 39.4 FL (ref 37–54)
EOSINOPHIL # BLD AUTO: 0.39 10*3/MM3 (ref 0–0.4)
EOSINOPHIL NFR BLD AUTO: 4.6 % (ref 0.3–6.2)
ERYTHROCYTE [DISTWIDTH] IN BLOOD BY AUTOMATED COUNT: 11.8 % (ref 12.3–15.4)
ERYTHROCYTE [SEDIMENTATION RATE] IN BLOOD: 38 MM/HR (ref 0–30)
HCT VFR BLD AUTO: 32.7 % (ref 34–46.6)
HGB BLD-MCNC: 10.8 G/DL (ref 12–15.9)
IMM GRANULOCYTES # BLD AUTO: 0.04 10*3/MM3 (ref 0–0.05)
IMM GRANULOCYTES NFR BLD AUTO: 0.5 % (ref 0–0.5)
LYMPHOCYTES # BLD AUTO: 1.92 10*3/MM3 (ref 0.7–3.1)
LYMPHOCYTES NFR BLD AUTO: 22.4 % (ref 19.6–45.3)
MCH RBC QN AUTO: 30.3 PG (ref 26.6–33)
MCHC RBC AUTO-ENTMCNC: 33 G/DL (ref 31.5–35.7)
MCV RBC AUTO: 91.6 FL (ref 79–97)
MONOCYTES # BLD AUTO: 1.11 10*3/MM3 (ref 0.1–0.9)
MONOCYTES NFR BLD AUTO: 13 % (ref 5–12)
NEUTROPHILS NFR BLD AUTO: 4.98 10*3/MM3 (ref 1.7–7)
NEUTROPHILS NFR BLD AUTO: 58.1 % (ref 42.7–76)
NRBC BLD AUTO-RTO: 0 /100 WBC (ref 0–0.2)
PLATELET # BLD AUTO: 435 10*3/MM3 (ref 140–450)
PMV BLD AUTO: 10.3 FL (ref 6–12)
RBC # BLD AUTO: 3.57 10*6/MM3 (ref 3.77–5.28)
WBC NRBC COR # BLD AUTO: 8.56 10*3/MM3 (ref 3.4–10.8)

## 2025-08-13 PROCEDURE — 1160F RVW MEDS BY RX/DR IN RCRD: CPT | Performed by: ORTHOPAEDIC SURGERY

## 2025-08-13 PROCEDURE — 85652 RBC SED RATE AUTOMATED: CPT

## 2025-08-13 PROCEDURE — 86140 C-REACTIVE PROTEIN: CPT

## 2025-08-13 PROCEDURE — 99214 OFFICE O/P EST MOD 30 MIN: CPT | Performed by: ORTHOPAEDIC SURGERY

## 2025-08-13 PROCEDURE — 36415 COLL VENOUS BLD VENIPUNCTURE: CPT

## 2025-08-13 PROCEDURE — 1159F MED LIST DOCD IN RCRD: CPT | Performed by: ORTHOPAEDIC SURGERY

## 2025-08-13 PROCEDURE — 85025 COMPLETE CBC W/AUTO DIFF WBC: CPT

## 2025-08-13 RX ORDER — LINEZOLID 600 MG/1
TABLET, FILM COATED ORAL
COMMUNITY
Start: 2025-08-12